# Patient Record
Sex: FEMALE | Race: WHITE | URBAN - METROPOLITAN AREA
[De-identification: names, ages, dates, MRNs, and addresses within clinical notes are randomized per-mention and may not be internally consistent; named-entity substitution may affect disease eponyms.]

---

## 2022-03-18 PROBLEM — R63.5 WEIGHT GAIN: Status: ACTIVE | Noted: 2019-09-25

## 2022-03-20 PROBLEM — E55.9 VITAMIN D DEFICIENCY: Status: ACTIVE | Noted: 2019-09-26

## 2022-06-02 DIAGNOSIS — E03.9 PRIMARY HYPOTHYROIDISM: Primary | ICD-10-CM

## 2022-06-02 DIAGNOSIS — E55.9 VITAMIN D DEFICIENCY: ICD-10-CM

## 2022-08-23 ENCOUNTER — OFFICE VISIT (OUTPATIENT)
Dept: ENDOCRINOLOGY | Age: 37
End: 2022-08-23
Payer: COMMERCIAL

## 2022-08-23 VITALS
HEART RATE: 60 BPM | WEIGHT: 180 LBS | SYSTOLIC BLOOD PRESSURE: 116 MMHG | DIASTOLIC BLOOD PRESSURE: 78 MMHG | OXYGEN SATURATION: 98 %

## 2022-08-23 DIAGNOSIS — E06.3 HASHIMOTO'S THYROIDITIS: ICD-10-CM

## 2022-08-23 DIAGNOSIS — E55.9 VITAMIN D DEFICIENCY: ICD-10-CM

## 2022-08-23 DIAGNOSIS — E03.9 PRIMARY HYPOTHYROIDISM: Primary | ICD-10-CM

## 2022-08-23 PROCEDURE — 99214 OFFICE O/P EST MOD 30 MIN: CPT | Performed by: INTERNAL MEDICINE

## 2022-08-23 RX ORDER — LEVOTHYROXINE SODIUM 75 MCG
75 TABLET ORAL DAILY
Qty: 90 TABLET | Refills: 3 | Status: SHIPPED | OUTPATIENT
Start: 2022-08-23 | End: 2022-10-10 | Stop reason: ALTCHOICE

## 2022-08-23 RX ORDER — ERGOCALCIFEROL (VITAMIN D2) 1250 MCG
50000 CAPSULE ORAL
Qty: 26 CAPSULE | Refills: 3 | Status: SHIPPED | OUTPATIENT
Start: 2022-08-25

## 2022-08-23 RX ORDER — LEVOTHYROXINE SODIUM 75 MCG
75 TABLET ORAL DAILY
COMMUNITY
End: 2022-08-23 | Stop reason: SDUPTHER

## 2022-08-23 RX ORDER — LIOTHYRONINE SODIUM 5 UG/1
5 TABLET ORAL DAILY
Qty: 90 TABLET | Refills: 3 | Status: SHIPPED | OUTPATIENT
Start: 2022-08-23

## 2022-08-23 ASSESSMENT — ENCOUNTER SYMPTOMS
CONSTIPATION: 0
DIARRHEA: 0

## 2022-08-23 NOTE — PROGRESS NOTES
Marcelo Villarreal MD, [address] [facility] [address]            Reason for visit: Follow-up of hypothyroidism and vitamin D deficiency      ASSESSMENT AND PLAN:    1. Primary hypothyroidism  She is biochemically euthyroid. Continue T4/T3 combination therapy as currently prescribed. Return in 6 months with labs. - SYNTHROID 75 MCG tablet; Take 1 tablet by mouth Daily  Dispense: 90 tablet; Refill: 3  - liothyronine (CYTOMEL) 5 MCG tablet; Take 1 tablet by mouth daily  Dispense: 90 tablet; Refill: 3  - TSH; Future  - T4, Free; Future  - T3; Future    2. Hashimoto's thyroiditis    3. Vitamin D deficiency  I would like to see her vitamin D level a bit higher. I will increase her vitamin D replacement dose as below. Reassess in 6 months. - ergocalciferol (ERGOCALCIFEROL) 1.25 MG (88339 UT) capsule; Take 1 capsule by mouth Twice a Week  Dispense: 26 capsule; Refill: 3  - Vitamin D 25 Hydroxy; Future      Follow-up and Dispositions    Return in about 6 months (around 2023). History of Present Illness:    THYROID DYSFUNCTION  Agapito East (\"[facility]\") is seen for follow-up of hypothyroidism; this was diagnosed in ~. Current symptoms: See review of systems below     Pregnancy / menses:  (1 salvador in 2016; 1 set of twins born in 2017). No plans for more pregnancies. Menses regular since mid-. Prior treatment: She has been on branded Levoxyl since diagnosis. She had taken Levoxyl 50 mcg daily 5 days per week and 100 mcg daily 2 days per week since ~May 2019.   Her regimen has been adjusted as follows:  -Levoxyl 50 mcg daily and liothyronine 5 mcg daily 2019  -Levoxyl 75 mcg daily and liothyronine 5 mcg daily 2020     Pertinent labs:  2018: TSH 0.90, free T4 0.9.  2018: T3 105, hemoglobin A1c 5.5%, estradiol 141.9, testosterone 10, free testosterone 1.7, sex hormone binding globulin 19, FSH 6.7, LH 4.3, fasting insulin 6.5, fasting glucose 94, total cholesterol 146, triglycerides 96, HDL 45, LDL 82.  1/25/2019: TSH 0.920, total cholesterol 146, triglycerides 53, HDL 47, LDL 88.  5/10/2019: Antithyroid peroxidase antibodies 73 (+), thyroglobulin antibodies 38.8 (+).  9/25/2019: TSH 1.180, free T4 1.28, T3 89, 25-hydroxy vitamin D 24.7.  12/30/2019: TSH 3.270, free T4 0.98, T3 92.  3/27/2020: TSH 1.770, free T4 0.88, T3 96, 25-hydroxy vitamin D 18.4.  9/10/2020: TSH 1.750, free T4 1.11, T3 87, 25-hydroxy vitamin D 37.5.  12/4/2020: Fasting insulin 6.4, glucose 94.  8/13/2021: TSH 2.830, free T4 1.09, T3 82, 25-hydroxy vitamin D 34.6.  2/16/2022: TSH 1.040, free T4 1.31, T3 107, 25-hydroxy vitamin D 29.8.  8/11/2022: TSH 0.939, free T4 1.23, T3 81, antithyroid peroxidase antibodies 113 (+), 25-hydroxy vitamin D 39.3. Imaging:  Prior ultrasound were unremarkable per patient. Review of Systems   Constitutional:  Positive for unexpected weight change (gained 5 pounds in the last 6 months). Negative for fatigue. Cardiovascular:  Negative for palpitations. Gastrointestinal:  Negative for constipation and diarrhea. Endocrine: Positive for cold intolerance. Genitourinary:  Negative for menstrual problem. /78 (Site: Left Upper Arm, Position: Sitting)   Pulse 60   Wt 180 lb (81.6 kg)   SpO2 98%   Wt Readings from Last 3 Encounters:   08/23/22 180 lb (81.6 kg)   02/23/22 175 lb (79.4 kg)       Physical Exam  HENT:      Head: Normocephalic. Neck:      Thyroid: No thyroid mass or thyromegaly. Cardiovascular:      Rate and Rhythm: Normal rate. Pulmonary:      Effort: No respiratory distress. Breath sounds: Normal breath sounds. Neurological:      Mental Status: She is alert.    Psychiatric:         Mood and Affect: Mood normal.         Behavior: Behavior normal.       Orders Placed This Encounter   Procedures    TSH     Standing Status:   Future     Standing Expiration Date:   8/23/2023    T4, Free Standing Status:   Future     Standing Expiration Date:   8/23/2023    T3     Standing Status:   Future     Standing Expiration Date:   8/23/2023    Vitamin D 25 Hydroxy     Standing Status:   Future     Standing Expiration Date:   8/23/2023         Current Outpatient Medications   Medication Sig Dispense Refill    [START ON 8/25/2022] ergocalciferol (ERGOCALCIFEROL) 1.25 MG (88457 UT) capsule Take 1 capsule by mouth Twice a Week 26 capsule 3    SYNTHROID 75 MCG tablet Take 1 tablet by mouth Daily 90 tablet 3    liothyronine (CYTOMEL) 5 MCG tablet Take 1 tablet by mouth daily 90 tablet 3     No current facility-administered medications for this visit. Marcelo Villarreal MD, FACE      Portions of this note were generated with the assistance of voice recognition software. As such, some errors in transcription may be present.

## 2022-10-10 RX ORDER — LEVOTHYROXINE SODIUM 75 UG/1
75 TABLET ORAL DAILY
Qty: 90 TABLET | Refills: 3 | Status: SHIPPED | OUTPATIENT
Start: 2022-10-10

## 2023-02-24 LAB
25(OH)D3+25(OH)D2 SERPL-MCNC: 70.4 NG/ML (ref 30–100)
T3 SERPL-MCNC: 110 NG/DL (ref 71–180)

## 2023-02-27 ENCOUNTER — OFFICE VISIT (OUTPATIENT)
Dept: ENDOCRINOLOGY | Age: 38
End: 2023-02-27
Payer: COMMERCIAL

## 2023-02-27 VITALS
DIASTOLIC BLOOD PRESSURE: 80 MMHG | OXYGEN SATURATION: 100 % | WEIGHT: 174 LBS | SYSTOLIC BLOOD PRESSURE: 106 MMHG | HEART RATE: 57 BPM

## 2023-02-27 DIAGNOSIS — E55.9 VITAMIN D DEFICIENCY: ICD-10-CM

## 2023-02-27 DIAGNOSIS — E03.9 PRIMARY HYPOTHYROIDISM: Primary | ICD-10-CM

## 2023-02-27 PROCEDURE — 99214 OFFICE O/P EST MOD 30 MIN: CPT | Performed by: INTERNAL MEDICINE

## 2023-02-27 RX ORDER — LIOTHYRONINE SODIUM 5 UG/1
5 TABLET ORAL DAILY
Qty: 90 TABLET | Refills: 3 | Status: SHIPPED | OUTPATIENT
Start: 2023-02-27

## 2023-02-27 RX ORDER — ERGOCALCIFEROL 1.25 MG/1
50000 CAPSULE ORAL
Qty: 26 CAPSULE | Refills: 3 | Status: SHIPPED | OUTPATIENT
Start: 2023-02-27

## 2023-02-27 RX ORDER — LEVOTHYROXINE SODIUM 75 UG/1
75 TABLET ORAL DAILY
Qty: 90 TABLET | Refills: 3 | Status: SHIPPED | OUTPATIENT
Start: 2023-02-27

## 2023-02-27 ASSESSMENT — ENCOUNTER SYMPTOMS
DIARRHEA: 0
CONSTIPATION: 0

## 2023-02-27 NOTE — PROGRESS NOTES
Modesta Fofana MD, [address]            Reason for visit: Follow-up of hypothyroidism and vitamin D deficiency      ASSESSMENT AND PLAN:    1. Primary hypothyroidism  She has been biochemically euthyroid on her current Levoxyl/liothyronine regimen. TSH and free T4 from last week are pending. If normal, she can return in 6 months with labs. - LEVOXYL 75 MCG tablet; Take 1 tablet by mouth Daily  Dispense: 90 tablet; Refill: 3  - liothyronine (CYTOMEL) 5 MCG tablet; Take 1 tablet by mouth daily  Dispense: 90 tablet; Refill: 3  - TSH; Future  - T4, Free; Future  - T3; Future    2. Vitamin D deficiency  Vitamin D is replete. - ergocalciferol (ERGOCALCIFEROL) 1.25 MG (53221 UT) capsule; Take 1 capsule by mouth Twice a Week  Dispense: 26 capsule; Refill: 3  - Vitamin D 25 Hydroxy; Future      Follow-up and Dispositions    Return in about 6 months (around 2023). History of Present Illness:    THYROID DYSFUNCTION  Dank Valenzuela (\"[facility]") is seen for follow-up of hypothyroidism; this was diagnosed in ~. Current symptoms: See review of systems below     Pregnancy / menses:  (1 salvador in 2016; 1 set of twins born in 2017). No plans for more pregnancies. Menses regular since mid-. Prior treatment: She has been on branded Levoxyl since diagnosis. She had taken Levoxyl 50 mcg daily 5 days per week and 100 mcg daily 2 days per week since ~May 2019.   Her regimen has been adjusted as follows:  -Levoxyl 50 mcg daily and liothyronine 5 mcg daily 2019  -Levoxyl 75 mcg daily and liothyronine 5 mcg daily 2020     Pertinent labs:  2018: TSH 0.90, free T4 0.9.  2018: T3 105, hemoglobin A1c 5.5%, estradiol 141.9, testosterone 10, free testosterone 1.7, sex hormone binding globulin 19, FSH 6.7, LH 4.3, fasting insulin 6.5, fasting glucose 94, total cholesterol 146, triglycerides 96, HDL 45, LDL 82.  1/25/2019: TSH 0.920, total cholesterol 146, triglycerides 53, HDL 47, LDL 88.  5/10/2019: Antithyroid peroxidase antibodies 73 (+), thyroglobulin antibodies 38.8 (+).  9/25/2019: TSH 1.180, free T4 1.28, T3 89, 25-hydroxy vitamin D 24.7.  12/30/2019: TSH 3.270, free T4 0.98, T3 92.  3/27/2020: TSH 1.770, free T4 0.88, T3 96, 25-hydroxy vitamin D 18.4.  9/10/2020: TSH 1.750, free T4 1.11, T3 87, 25-hydroxy vitamin D 37.5.  12/4/2020: Fasting insulin 6.4, glucose 94.  8/13/2021: TSH 2.830, free T4 1.09, T3 82, 25-hydroxy vitamin D 34.6.  2/16/2022: TSH 1.040, free T4 1.31, T3 107, 25-hydroxy vitamin D 29.8.  8/11/2022: TSH 0.939, free T4 1.23, T3 81, antithyroid peroxidase antibodies 113 (+), 25-hydroxy vitamin D 39.3.  2/23/2023: TSH ordered but not run, free T4 ordered but not run, T3 110, 25-hydroxy vitamin D 70.4. Imaging: Prior ultrasounds were unremarkable per patient. Review of Systems   Constitutional:  Negative for fatigue and unexpected weight change (previously gained 5 pounds in 6 months; intentionally lost 6 pounds in the last 6 months). Cardiovascular:  Negative for palpitations. Gastrointestinal:  Negative for constipation and diarrhea. Endocrine: Positive for cold intolerance. Genitourinary:  Negative for menstrual problem. Psychiatric/Behavioral:  Negative for sleep disturbance. /80   Pulse 57   Wt 174 lb (78.9 kg)   SpO2 100%   Wt Readings from Last 3 Encounters:   02/27/23 174 lb (78.9 kg)   08/23/22 180 lb (81.6 kg)   02/23/22 175 lb (79.4 kg)       Physical Exam  HENT:      Head: Normocephalic. Neck:      Thyroid: No thyroid mass or thyromegaly. Cardiovascular:      Rate and Rhythm: Normal rate. Pulmonary:      Effort: No respiratory distress. Breath sounds: Normal breath sounds. Neurological:      Mental Status: She is alert.    Psychiatric:         Mood and Affect: Mood normal.         Behavior: Behavior normal.       Orders Placed This Encounter Procedures    TSH     Standing Status:   Future     Standing Expiration Date:   2/27/2024    T4, Free     Standing Status:   Future     Standing Expiration Date:   2/27/2024    T3     Standing Status:   Future     Standing Expiration Date:   2/27/2024    Vitamin D 25 Hydroxy     Standing Status:   Future     Standing Expiration Date:   2/27/2024           Current Outpatient Medications   Medication Sig Dispense Refill    LEVOXYL 75 MCG tablet Take 1 tablet by mouth Daily 90 tablet 3    liothyronine (CYTOMEL) 5 MCG tablet Take 1 tablet by mouth daily 90 tablet 3    ergocalciferol (ERGOCALCIFEROL) 1.25 MG (85441 UT) capsule Take 1 capsule by mouth Twice a Week 26 capsule 3     No current facility-administered medications for this visit. Robyn Maldonado MD, FACE      Portions of this note were generated with the assistance of voice recognition software. As such, some errors in transcription may be present.

## 2023-03-06 DIAGNOSIS — E03.9 PRIMARY HYPOTHYROIDISM: ICD-10-CM

## 2023-03-06 RX ORDER — LIOTHYRONINE SODIUM 5 UG/1
5 TABLET ORAL DAILY
Qty: 90 TABLET | Refills: 3 | Status: SHIPPED | OUTPATIENT
Start: 2023-03-06

## 2023-03-06 RX ORDER — LEVOTHYROXINE SODIUM 75 UG/1
75 TABLET ORAL DAILY
Qty: 90 TABLET | Refills: 3 | Status: SHIPPED | OUTPATIENT
Start: 2023-03-06

## 2023-08-17 LAB
25(OH)D3+25(OH)D2 SERPL-MCNC: 59 NG/ML (ref 30–100)
T3 SERPL-MCNC: 89 NG/DL (ref 71–180)
T4 FREE SERPL-MCNC: 1.19 NG/DL (ref 0.82–1.77)
TSH SERPL DL<=0.005 MIU/L-ACNC: 1.08 UIU/ML (ref 0.45–4.5)

## 2023-08-25 ENCOUNTER — OFFICE VISIT (OUTPATIENT)
Dept: ENDOCRINOLOGY | Age: 38
End: 2023-08-25
Payer: COMMERCIAL

## 2023-08-25 VITALS
OXYGEN SATURATION: 99 % | WEIGHT: 180 LBS | HEART RATE: 69 BPM | SYSTOLIC BLOOD PRESSURE: 108 MMHG | DIASTOLIC BLOOD PRESSURE: 80 MMHG

## 2023-08-25 DIAGNOSIS — E03.9 PRIMARY HYPOTHYROIDISM: Primary | ICD-10-CM

## 2023-08-25 DIAGNOSIS — E55.9 VITAMIN D DEFICIENCY: ICD-10-CM

## 2023-08-25 PROCEDURE — 99214 OFFICE O/P EST MOD 30 MIN: CPT | Performed by: INTERNAL MEDICINE

## 2023-08-25 RX ORDER — LEVOTHYROXINE SODIUM 75 UG/1
75 TABLET ORAL DAILY
Qty: 90 TABLET | Refills: 3 | Status: SHIPPED | OUTPATIENT
Start: 2023-08-25

## 2023-08-25 RX ORDER — ERGOCALCIFEROL 1.25 MG/1
50000 CAPSULE ORAL
Qty: 26 CAPSULE | Refills: 3 | Status: SHIPPED | OUTPATIENT
Start: 2023-08-28

## 2023-08-25 RX ORDER — LIOTHYRONINE SODIUM 5 UG/1
5 TABLET ORAL DAILY
Qty: 90 TABLET | Refills: 3 | Status: SHIPPED | OUTPATIENT
Start: 2023-08-25

## 2023-08-25 ASSESSMENT — ENCOUNTER SYMPTOMS
CONSTIPATION: 0
DIARRHEA: 0

## 2023-08-25 NOTE — PROGRESS NOTES
Luis Carlos Pelayo MD, [facility]            Reason for visit: Follow-up of hypothyroidism and vitamin D deficiency      ASSESSMENT AND PLAN:    1. Primary hypothyroidism  Thyroid function is normal.  Continue levothyroxine and liothyronine as prescribed; return in 6 months with labs. - LEVOXYL 75 MCG tablet; Take 1 tablet by mouth Daily  Dispense: 90 tablet; Refill: 3  - liothyronine (CYTOMEL) 5 MCG tablet; Take 1 tablet by mouth daily  Dispense: 90 tablet; Refill: 3  - TSH; Future  - T4, Free; Future  - T3; Future    2. Vitamin D deficiency  Vitamin D is replete. - ergocalciferol (ERGOCALCIFEROL) 1.25 MG (78777 UT) capsule; Take 1 capsule by mouth Twice a Week  Dispense: 26 capsule; Refill: 3  - Vitamin D 25 Hydroxy; Future      Follow-up and Dispositions    Return in about 6 months (around 2024). History of Present Illness:    THYROID DYSFUNCTION  Laura Nguyen (\"[facility]\") is seen for follow-up of hypothyroidism; this was diagnosed in ~. Current symptoms: See review of systems below     Pregnancy / menses:  (1 salvador in 2016; 1 set of twins born in 2017). No plans for more pregnancies. Menses regular since mid-. Prior treatment: She has been on branded Levoxyl since diagnosis. She had taken Levoxyl 50 mcg daily 5 days per week and 100 mcg daily 2 days per week since ~May 2019.   Her regimen has been adjusted as follows:  -Levoxyl 50 mcg daily and liothyronine 5 mcg daily 2019  -Levoxyl 75 mcg daily and liothyronine 5 mcg daily 2020     Pertinent labs:  2018: TSH 0.90, free T4 0.9.  2018: T3 105, hemoglobin A1c 5.5%, estradiol 141.9, testosterone 10, free testosterone 1.7, sex hormone binding globulin 19, FSH 6.7, LH 4.3, fasting insulin 6.5, fasting glucose 94, total cholesterol 146, triglycerides 96, HDL 45, LDL 82.  2019: TSH 0.920, total cholesterol 146, triglycerides 53, HDL

## 2023-10-22 DIAGNOSIS — E03.9 PRIMARY HYPOTHYROIDISM: ICD-10-CM

## 2023-10-25 RX ORDER — LEVOTHYROXINE SODIUM 75 UG/1
75 TABLET ORAL DAILY
Qty: 90 TABLET | Refills: 3 | OUTPATIENT
Start: 2023-10-25

## 2023-11-11 DIAGNOSIS — E03.9 PRIMARY HYPOTHYROIDISM: ICD-10-CM

## 2023-11-14 RX ORDER — LEVOTHYROXINE SODIUM 75 UG/1
75 TABLET ORAL DAILY
Qty: 90 TABLET | Refills: 3 | OUTPATIENT
Start: 2023-11-14

## 2023-11-17 DIAGNOSIS — E03.9 PRIMARY HYPOTHYROIDISM: ICD-10-CM

## 2023-11-17 RX ORDER — LEVOTHYROXINE SODIUM 75 UG/1
75 TABLET ORAL DAILY
Qty: 90 TABLET | Refills: 3 | OUTPATIENT
Start: 2023-11-17

## 2023-11-18 DIAGNOSIS — E03.9 PRIMARY HYPOTHYROIDISM: ICD-10-CM

## 2023-11-20 RX ORDER — LEVOTHYROXINE SODIUM 75 UG/1
75 TABLET ORAL DAILY
Qty: 90 TABLET | Refills: 3 | Status: SHIPPED | OUTPATIENT
Start: 2023-11-20

## 2024-02-15 LAB
T3 SERPL-MCNC: 81 NG/DL (ref 71–180)
T4 FREE SERPL-MCNC: 1.17 NG/DL (ref 0.82–1.77)
TSH SERPL DL<=0.005 MIU/L-ACNC: 1.12 UIU/ML (ref 0.45–4.5)

## 2024-04-19 ENCOUNTER — OFFICE VISIT (OUTPATIENT)
Dept: ENDOCRINOLOGY | Age: 39
End: 2024-04-19
Payer: COMMERCIAL

## 2024-04-19 VITALS
SYSTOLIC BLOOD PRESSURE: 110 MMHG | DIASTOLIC BLOOD PRESSURE: 78 MMHG | OXYGEN SATURATION: 98 % | WEIGHT: 179 LBS | HEART RATE: 72 BPM

## 2024-04-19 DIAGNOSIS — E03.9 PRIMARY HYPOTHYROIDISM: ICD-10-CM

## 2024-04-19 DIAGNOSIS — E55.9 VITAMIN D DEFICIENCY: ICD-10-CM

## 2024-04-19 PROCEDURE — 99214 OFFICE O/P EST MOD 30 MIN: CPT | Performed by: INTERNAL MEDICINE

## 2024-04-19 RX ORDER — LEVOTHYROXINE SODIUM 75 UG/1
75 TABLET ORAL DAILY
Qty: 90 TABLET | Refills: 3 | Status: SHIPPED | OUTPATIENT
Start: 2024-04-19

## 2024-04-19 RX ORDER — LIOTHYRONINE SODIUM 5 UG/1
5 TABLET ORAL DAILY
Qty: 90 TABLET | Refills: 3 | Status: SHIPPED | OUTPATIENT
Start: 2024-04-19

## 2024-04-19 RX ORDER — ERGOCALCIFEROL 1.25 MG/1
50000 CAPSULE ORAL
Qty: 26 CAPSULE | Refills: 3 | Status: SHIPPED | OUTPATIENT
Start: 2024-04-22

## 2024-04-19 ASSESSMENT — ENCOUNTER SYMPTOMS
CONSTIPATION: 0
DIARRHEA: 0

## 2024-04-19 NOTE — PROGRESS NOTES
NAYA Angeles MD, [facility] ENDOCRINOLOGY   AND   [facility]            Reason for visit: Follow-up of hypothyroidism and vitamin D deficiency      ASSESSMENT AND PLAN:    1. Primary hypothyroidism  Thyroid function is normal.  Continue levothyroxine and liothyronine as prescribed; return in 6 months with labs.  She states that Dr. Root recently checked thyroid labs as well.  I would request that those be sent to me.  - LEVOXYL 75 MCG tablet; Take 1 tablet by mouth Daily  Dispense: 90 tablet; Refill: 3  - liothyronine (CYTOMEL) 5 MCG tablet; Take 1 tablet by mouth daily  Dispense: 90 tablet; Refill: 3  - TSH; Future  - T4, Free; Future  - T3; Future    2. Vitamin D deficiency  Vitamin D is replete.  - ergocalciferol (ERGOCALCIFEROL) 1.25 MG (85366 UT) capsule; Take 1 capsule by mouth Twice a Week  Dispense: 26 capsule; Refill: 3  - Vitamin D 25 Hydroxy; Future      Follow-up and Dispositions    Return in about 6 months (around 10/19/2024).               History of Present Illness:    THYROID DYSFUNCTION  Donna Adams (\"[facility]\") is seen for follow-up of hypothyroidism; this was diagnosed in ~.       Current symptoms: See review of systems below     Pregnancy / menses:  (1 salvador in 2016; 1 set of twins born in 2017).  No plans for more pregnancies.  Menses regular since mid-.     Prior treatment: She has been on branded Levoxyl since diagnosis.  She had taken Levoxyl 50 mcg daily 5 days per week and 100 mcg daily 2 days per week since ~May 2019.  Her regimen has been adjusted as follows:  -Levoxyl 50 mcg daily and liothyronine 5 mcg daily 2019  -Levoxyl 75 mcg daily and liothyronine 5 mcg daily 2020     Pertinent labs:  2018: TSH 0.90, free T4 0.9.  2018: T3 105, hemoglobin A1c 5.5%, estradiol 141.9, testosterone 10, free testosterone 1.7, sex hormone binding globulin 19, FSH 6.7, LH 4.3, fasting insulin 6.5, fasting glucose 94, total

## 2024-06-20 DIAGNOSIS — E03.9 PRIMARY HYPOTHYROIDISM: ICD-10-CM

## 2024-06-21 RX ORDER — LIOTHYRONINE SODIUM 5 UG/1
5 TABLET ORAL DAILY
Qty: 90 TABLET | Refills: 3 | OUTPATIENT
Start: 2024-06-21

## 2024-10-19 LAB
25(OH)D3+25(OH)D2 SERPL-MCNC: 44.4 NG/ML (ref 30–100)
T3 SERPL-MCNC: 91 NG/DL (ref 71–180)
T4 FREE SERPL-MCNC: 1.27 NG/DL (ref 0.82–1.77)
TSH SERPL DL<=0.005 MIU/L-ACNC: 1.98 UIU/ML (ref 0.45–4.5)

## 2024-10-24 ENCOUNTER — OFFICE VISIT (OUTPATIENT)
Dept: ENDOCRINOLOGY | Age: 39
End: 2024-10-24
Payer: COMMERCIAL

## 2024-10-24 VITALS
OXYGEN SATURATION: 100 % | WEIGHT: 185 LBS | HEART RATE: 58 BPM | HEIGHT: 66 IN | DIASTOLIC BLOOD PRESSURE: 59 MMHG | RESPIRATION RATE: 16 BRPM | BODY MASS INDEX: 29.73 KG/M2 | SYSTOLIC BLOOD PRESSURE: 100 MMHG

## 2024-10-24 DIAGNOSIS — E03.9 PRIMARY HYPOTHYROIDISM: Primary | ICD-10-CM

## 2024-10-24 DIAGNOSIS — E55.9 VITAMIN D DEFICIENCY: ICD-10-CM

## 2024-10-24 PROCEDURE — 99214 OFFICE O/P EST MOD 30 MIN: CPT | Performed by: INTERNAL MEDICINE

## 2024-10-24 RX ORDER — LIOTHYRONINE SODIUM 5 UG/1
5 TABLET ORAL DAILY
Qty: 90 TABLET | Refills: 3 | Status: SHIPPED | OUTPATIENT
Start: 2024-10-24

## 2024-10-24 ASSESSMENT — ENCOUNTER SYMPTOMS
DIARRHEA: 0
CONSTIPATION: 0

## 2024-10-24 NOTE — PROGRESS NOTES
NAYA Angeles MD, [facility] ENDOCRINOLOGY   AND   THYROID [facility]ODULE CL[facility]            Reason for visit: Follow-up of hypothyroidism and vitamin D deficiency      ASSESSMENT AND PLAN:    1. Primary hypothyroidism  Thyroid function is normal.  Continue levothyroxine and liothyronine as prescribed; return in 6 months with labs.    - LEVOXYL 75 MCG tablet; Take 1 tablet by mouth Daily  Dispense: 90 tablet; Refill: 3  - liothyronine (CYTOMEL) 5 MCG tablet; Take 1 tablet by mouth daily  Dispense: 90 tablet; Refill: 3  - TSH; Future  - T4, Free; Future  - T3; Future    2. Vitamin D deficiency  Vitamin D is replete.  - ergocalciferol (ERGOCALCIFEROL) 1.25 MG (30004 UT) capsule; Take 1 capsule by mouth Twice a Week  Dispense: 26 capsule; Refill: 3  - Vitamin D 25 Hydroxy; Future      Follow-up and Dispositions    Return in about 6 months (around 2025).                 History of Present Illness:    THYROID DYSFUNCTION  Donna Adams (\"[facility]\") is seen for follow-up of hypothyroidism; this was diagnosed in ~.       Current symptoms: See review of systems below     Pregnancy / menses:  (1 salvador in 2016; 1 set of twins born in 2017).  No plans for more pregnancies.  Menses regular since mid-.     Prior treatment: She has been on branded Levoxyl since diagnosis.  She had taken Levoxyl 50 mcg daily 5 days per week and 100 mcg daily 2 days per week since ~May 2019.  Her regimen has been adjusted as follows:  -Levoxyl 50 mcg daily and liothyronine 5 mcg daily 2019  -Levoxyl 75 mcg daily and liothyronine 5 mcg daily 2020     Pertinent labs:  2018: TSH 0.90, free T4 0.9.  2018: T3 105, hemoglobin A1c 5.5%, estradiol 141.9, testosterone 10, free testosterone 1.7, sex hormone binding globulin 19, FSH 6.7, LH 4.3, fasting insulin 6.5, fasting glucose 94, total cholesterol 146, triglycerides 96, HDL 45, LDL 82.  2019: TSH 0.920, total cholesterol 146, triglycerides 53,

## 2025-04-15 DIAGNOSIS — E03.9 PRIMARY HYPOTHYROIDISM: ICD-10-CM

## 2025-04-16 RX ORDER — LIOTHYRONINE SODIUM 5 UG/1
5 TABLET ORAL DAILY
Qty: 90 TABLET | Refills: 3 | Status: SHIPPED | OUTPATIENT
Start: 2025-04-16

## 2025-04-22 LAB — 25(OH)D3+25(OH)D2 SERPL-MCNC: 31.5 NG/ML (ref 30–100)

## 2025-04-23 LAB
T3 SERPL-MCNC: 95 NG/DL (ref 71–180)
T4 FREE SERPL-MCNC: 1.04 NG/DL (ref 0.82–1.77)
TSH SERPL DL<=0.005 MIU/L-ACNC: 1.95 UIU/ML (ref 0.45–4.5)

## 2025-04-24 DIAGNOSIS — E55.9 VITAMIN D DEFICIENCY: ICD-10-CM

## 2025-04-24 DIAGNOSIS — E03.9 PRIMARY HYPOTHYROIDISM: ICD-10-CM

## 2025-04-25 ENCOUNTER — OFFICE VISIT (OUTPATIENT)
Dept: ENDOCRINOLOGY | Age: 40
End: 2025-04-25
Payer: COMMERCIAL

## 2025-04-25 VITALS
DIASTOLIC BLOOD PRESSURE: 78 MMHG | HEART RATE: 78 BPM | WEIGHT: 180 LBS | BODY MASS INDEX: 28.93 KG/M2 | HEIGHT: 66 IN | OXYGEN SATURATION: 98 % | SYSTOLIC BLOOD PRESSURE: 120 MMHG

## 2025-04-25 DIAGNOSIS — E55.9 VITAMIN D DEFICIENCY: ICD-10-CM

## 2025-04-25 DIAGNOSIS — E03.9 PRIMARY HYPOTHYROIDISM: Primary | ICD-10-CM

## 2025-04-25 PROCEDURE — 99214 OFFICE O/P EST MOD 30 MIN: CPT | Performed by: INTERNAL MEDICINE

## 2025-04-25 ASSESSMENT — ENCOUNTER SYMPTOMS
DIARRHEA: 0
CONSTIPATION: 0

## 2025-04-25 NOTE — PROGRESS NOTES
NAYA Angeles MD, [facility] ENDOCRINOLOGY   AND   THYROID NODULE CLINIC            Reason for visit: Follow-up of hypothyroidism and vitamin D deficiency      ASSESSMENT AND PLAN:    1. Primary hypothyroidism  Thyroid function is normal.  Continue levothyroxine and liothyronine as prescribed; return in 6 months with labs.    - LEVOXYL 75 MCG tablet; Take 1 tablet by mouth Daily  Dispense: 90 tablet; Refill: 3  - liothyronine (CYTOMEL) 5 MCG tablet; Take 1 tablet by mouth daily  Dispense: 90 tablet; Refill: 3  - TSH; Future  - T4, Free; Future  - T3; Future    2. Vitamin D deficiency  Vitamin D is replete.  - ergocalciferol (ERGOCALCIFEROL) 1.25 MG (15251 UT) capsule; Take 1 capsule by mouth Twice a Week  Dispense: 26 capsule; Refill: 3  - Vitamin D 25 Hydroxy; Future                    History of Present Illness:    THYROID DYSFUNCTION  Donna Adams (\"[facility]\") is seen for follow-up of hypothyroidism; this was diagnosed in ~.       Current symptoms: See review of systems below     Pregnancy / menses:  (1 salvador in 2016; 1 set of twins born in 2017).  No plans for more pregnancies.  Menses regular since mid-.     Prior treatment: She has been on branded Levoxyl since diagnosis.  She had taken Levoxyl 50 mcg daily 5 days per week and 100 mcg daily 2 days per week since ~May 2019.  Her regimen has been adjusted as follows:  -Levoxyl 50 mcg daily and liothyronine 5 mcg daily 2019  -Levoxyl 75 mcg daily and liothyronine 5 mcg daily 2020     Pertinent labs:  2018: TSH 0.90, free T4 0.9.  2018: T3 105, hemoglobin A1c 5.5%, estradiol 141.9, testosterone 10, free testosterone 1.7, sex hormone binding globulin 19, FSH 6.7, LH 4.3, fasting insulin 6.5, fasting glucose 94, total cholesterol 146, triglycerides 96, HDL 45, LDL 82.  2019: TSH 0.920, total cholesterol 146, triglycerides 53, HDL 47, LDL 88.  5/10/2019: Antithyroid peroxidase antibodies 73 (+),

## 2025-07-28 DIAGNOSIS — E03.9 PRIMARY HYPOTHYROIDISM: ICD-10-CM

## 2025-07-28 RX ORDER — LEVOTHYROXINE SODIUM 75 UG/1
75 TABLET ORAL DAILY
Qty: 90 TABLET | Refills: 3 | Status: SHIPPED | OUTPATIENT
Start: 2025-07-28